# Patient Record
Sex: MALE | Race: WHITE | NOT HISPANIC OR LATINO | Employment: OTHER | URBAN - METROPOLITAN AREA
[De-identification: names, ages, dates, MRNs, and addresses within clinical notes are randomized per-mention and may not be internally consistent; named-entity substitution may affect disease eponyms.]

---

## 2018-12-26 ENCOUNTER — TELEPHONE (OUTPATIENT)
Dept: UROLOGY | Facility: CLINIC | Age: 33
End: 2018-12-26

## 2018-12-26 NOTE — TELEPHONE ENCOUNTER
Returned patient's call, explained that we are not accepting new medicaid pt's at the moment and offered medicaid referral phone line.

## 2018-12-26 NOTE — TELEPHONE ENCOUNTER
----- Message from More Hollingsworth sent at 12/26/2018 11:31 AM CST -----  Contact: self  Patient need to speak to nurse regarding if office received his referral for a vasotomy       Please call to advice [phone] or [phone]

## 2020-10-19 ENCOUNTER — TELEPHONE (OUTPATIENT)
Dept: OTOLARYNGOLOGY | Facility: CLINIC | Age: 35
End: 2020-10-19

## 2020-10-23 ENCOUNTER — TELEPHONE (OUTPATIENT)
Dept: OTOLARYNGOLOGY | Facility: CLINIC | Age: 35
End: 2020-10-23

## 2020-10-29 ENCOUNTER — TELEPHONE (OUTPATIENT)
Dept: OTOLARYNGOLOGY | Facility: CLINIC | Age: 35
End: 2020-10-29

## 2020-10-30 ENCOUNTER — TELEPHONE (OUTPATIENT)
Dept: OTOLARYNGOLOGY | Facility: CLINIC | Age: 35
End: 2020-10-30

## 2020-12-09 ENCOUNTER — HOSPITAL ENCOUNTER (EMERGENCY)
Facility: HOSPITAL | Age: 35
Discharge: LEFT BEFORE DISCHARGE | End: 2020-12-09
Attending: SURGERY
Payer: MEDICAID

## 2020-12-09 VITALS
WEIGHT: 141 LBS | DIASTOLIC BLOOD PRESSURE: 83 MMHG | BODY MASS INDEX: 22.08 KG/M2 | RESPIRATION RATE: 18 BRPM | SYSTOLIC BLOOD PRESSURE: 134 MMHG | HEART RATE: 96 BPM | OXYGEN SATURATION: 100 % | TEMPERATURE: 97 F

## 2020-12-09 DIAGNOSIS — M25.569 ACUTE KNEE PAIN, UNSPECIFIED LATERALITY: Primary | ICD-10-CM

## 2020-12-09 DIAGNOSIS — M25.559 HIP PAIN: ICD-10-CM

## 2020-12-09 PROCEDURE — 99281 EMR DPT VST MAYX REQ PHY/QHP: CPT

## 2020-12-09 NOTE — ED TRIAGE NOTES
"35 y.o. male presents to ER    Chief Complaint   Patient presents with    Neck Pain   Pt reports pain from "head down to my left foot". Pt reports 3 weeks ago he was moving and felt a "pop". Ever since then, pt has been having numbness from "chest up". Pt reports his "knee and hip gave out on me this morning".    No acute distress noted.    "

## 2020-12-09 NOTE — ED PROVIDER NOTES
Encounter Date: 2020       History     Chief Complaint   Patient presents with    Neck Pain     I saw this patient in the triage area after he had been triaged and was considered to be in stable condition with a chronic neck problem.  This is the 3rd facility apparently today that he has been seen at, left AMA from the prior facility and was very disrespectful to the staff that was attempting to evaluate his problem.  When I saw him in the triage area myself I ask him what the problem was, and I perceived his attitude and initial response to me to be very hostile.  I informed him that I would be happy to evaluate him for his acute medical problem and any acute injuries, but that an MRI scan of his cervical spine for a problem of 3 years, which reportedly has already been scheduled as an outpatient procedure as part of his workup, would not be possible today in this emergency department.  The patient began cursing and was very disrespectful, and then left AMA.        Review of patient's allergies indicates:   Allergen Reactions    Pcn [penicillins] Rash    Sulfa (sulfonamide antibiotics) Other (See Comments)     Childhood allergy. Pt is unsure of sx.      History reviewed. No pertinent past medical history.  Past Surgical History:   Procedure Laterality Date    NECK SURGERY      TIBIA FRACTURE SURGERY       Family History   Problem Relation Age of Onset    Heart disease Father     Hypertension Father     Heart disease Paternal Grandmother     Hypertension Paternal Grandmother     Heart disease Paternal Grandfather     Hypertension Paternal Grandfather      Social History     Tobacco Use    Smoking status: Former Smoker     Types: Cigarettes     Quit date: 2011     Years since quittin.6    Smokeless tobacco: Never Used   Substance Use Topics    Alcohol use: No    Drug use: No     Review of Systems   Unable to perform ROS: Acuity of condition       Physical Exam     Initial Vitals [20  1419]   BP Pulse Resp Temp SpO2   134/83 96 18 97.3 °F (36.3 °C) 100 %      MAP       --         Physical Exam    Nursing note and vitals reviewed.  Constitutional: He appears well-developed and well-nourished.   Musculoskeletal: Normal range of motion.   Neurological: He is alert and oriented to person, place, and time.         ED Course   Procedures  Labs Reviewed - No data to display       Imaging Results    None                                      Clinical Impression:     ICD-10-CM ICD-9-CM   1. Acute knee pain, unspecified laterality  M25.569 719.46   2. Hip pain  M25.559 719.45                      Disposition:   Disposition: AMA  Condition: Stable                          Malachi Foster Jr., MD  12/09/20 4718

## 2021-12-31 ENCOUNTER — HOSPITAL ENCOUNTER (EMERGENCY)
Facility: HOSPITAL | Age: 36
Discharge: HOME | End: 2021-12-31
Attending: EMERGENCY MEDICINE
Payer: MEDICAID

## 2021-12-31 VITALS
WEIGHT: 150 LBS | HEART RATE: 72 BPM | BODY MASS INDEX: 23.54 KG/M2 | SYSTOLIC BLOOD PRESSURE: 119 MMHG | TEMPERATURE: 98 F | HEIGHT: 67 IN | RESPIRATION RATE: 18 BRPM | OXYGEN SATURATION: 100 % | DIASTOLIC BLOOD PRESSURE: 61 MMHG

## 2021-12-31 DIAGNOSIS — N50.819 TESTICLE PAIN: ICD-10-CM

## 2021-12-31 DIAGNOSIS — R10.9 ABDOMINAL PAIN, UNSPECIFIED ABDOMINAL LOCATION: Primary | ICD-10-CM

## 2021-12-31 LAB
ALBUMIN SERPL BCP-MCNC: 4.7 G/DL (ref 3.5–5.2)
ALP SERPL-CCNC: 64 U/L (ref 55–135)
ALT SERPL W/O P-5'-P-CCNC: 69 U/L (ref 10–44)
AMPHET+METHAMPHET UR QL: NEGATIVE
ANION GAP SERPL CALC-SCNC: 10 MMOL/L (ref 8–16)
AST SERPL-CCNC: 31 U/L (ref 10–40)
BARBITURATES UR QL SCN>200 NG/ML: NEGATIVE
BASOPHILS # BLD AUTO: 0.06 K/UL (ref 0–0.2)
BASOPHILS NFR BLD: 0.5 % (ref 0–1.9)
BENZODIAZ UR QL SCN>200 NG/ML: NEGATIVE
BILIRUB SERPL-MCNC: 1 MG/DL (ref 0.1–1)
BILIRUB UR QL STRIP: NEGATIVE
BUN SERPL-MCNC: 11 MG/DL (ref 6–20)
BZE UR QL SCN: NEGATIVE
CALCIUM SERPL-MCNC: 9.4 MG/DL (ref 8.7–10.5)
CANNABINOIDS UR QL SCN: ABNORMAL
CHLORIDE SERPL-SCNC: 103 MMOL/L (ref 95–110)
CLARITY UR: CLEAR
CO2 SERPL-SCNC: 27 MMOL/L (ref 23–29)
COLOR UR: YELLOW
CREAT SERPL-MCNC: 0.9 MG/DL (ref 0.5–1.4)
CREAT UR-MCNC: 52 MG/DL (ref 23–375)
DIFFERENTIAL METHOD: ABNORMAL
EOSINOPHIL # BLD AUTO: 0.2 K/UL (ref 0–0.5)
EOSINOPHIL NFR BLD: 1.2 % (ref 0–8)
ERYTHROCYTE [DISTWIDTH] IN BLOOD BY AUTOMATED COUNT: 11.9 % (ref 11.5–14.5)
EST. GFR  (AFRICAN AMERICAN): >60 ML/MIN/1.73 M^2
EST. GFR  (NON AFRICAN AMERICAN): >60 ML/MIN/1.73 M^2
GLUCOSE SERPL-MCNC: 95 MG/DL (ref 70–110)
GLUCOSE UR QL STRIP: NEGATIVE
HCT VFR BLD AUTO: 49.6 % (ref 40–54)
HGB BLD-MCNC: 17.4 G/DL (ref 14–18)
HGB UR QL STRIP: NEGATIVE
IMM GRANULOCYTES # BLD AUTO: 0.15 K/UL (ref 0–0.04)
IMM GRANULOCYTES NFR BLD AUTO: 1.2 % (ref 0–0.5)
KETONES UR QL STRIP: NEGATIVE
LEUKOCYTE ESTERASE UR QL STRIP: NEGATIVE
LIPASE SERPL-CCNC: 28 U/L (ref 4–60)
LYMPHOCYTES # BLD AUTO: 3.8 K/UL (ref 1–4.8)
LYMPHOCYTES NFR BLD: 30.7 % (ref 18–48)
MCH RBC QN AUTO: 31.6 PG (ref 27–31)
MCHC RBC AUTO-ENTMCNC: 35.1 G/DL (ref 32–36)
MCV RBC AUTO: 90 FL (ref 82–98)
MONOCYTES # BLD AUTO: 0.7 K/UL (ref 0.3–1)
MONOCYTES NFR BLD: 5.5 % (ref 4–15)
NEUTROPHILS # BLD AUTO: 7.4 K/UL (ref 1.8–7.7)
NEUTROPHILS NFR BLD: 60.9 % (ref 38–73)
NITRITE UR QL STRIP: NEGATIVE
NRBC BLD-RTO: 0 /100 WBC
OPIATES UR QL SCN: NEGATIVE
PCP UR QL SCN>25 NG/ML: NEGATIVE
PH UR STRIP: 7 [PH] (ref 5–8)
PLATELET # BLD AUTO: 302 K/UL (ref 150–450)
PMV BLD AUTO: 10.4 FL (ref 9.2–12.9)
POTASSIUM SERPL-SCNC: 3.9 MMOL/L (ref 3.5–5.1)
PROT SERPL-MCNC: 8.2 G/DL (ref 6–8.4)
PROT UR QL STRIP: NEGATIVE
RBC # BLD AUTO: 5.51 M/UL (ref 4.6–6.2)
SODIUM SERPL-SCNC: 140 MMOL/L (ref 136–145)
SP GR UR STRIP: 1.01 (ref 1–1.03)
TOXICOLOGY INFORMATION: ABNORMAL
URN SPEC COLLECT METH UR: NORMAL
UROBILINOGEN UR STRIP-ACNC: NEGATIVE EU/DL
WBC # BLD AUTO: 12.21 K/UL (ref 3.9–12.7)

## 2021-12-31 PROCEDURE — 99285 EMERGENCY DEPT VISIT HI MDM: CPT | Mod: 25

## 2021-12-31 PROCEDURE — 81003 URINALYSIS AUTO W/O SCOPE: CPT | Mod: 59 | Performed by: NURSE PRACTITIONER

## 2021-12-31 PROCEDURE — 85025 COMPLETE CBC W/AUTO DIFF WBC: CPT | Performed by: NURSE PRACTITIONER

## 2021-12-31 PROCEDURE — 83690 ASSAY OF LIPASE: CPT | Performed by: NURSE PRACTITIONER

## 2021-12-31 PROCEDURE — 96374 THER/PROPH/DIAG INJ IV PUSH: CPT

## 2021-12-31 PROCEDURE — 80053 COMPREHEN METABOLIC PANEL: CPT | Performed by: NURSE PRACTITIONER

## 2021-12-31 PROCEDURE — 80307 DRUG TEST PRSMV CHEM ANLYZR: CPT | Performed by: NURSE PRACTITIONER

## 2021-12-31 PROCEDURE — 25500020 PHARM REV CODE 255: Performed by: NURSE PRACTITIONER

## 2021-12-31 PROCEDURE — 63600175 PHARM REV CODE 636 W HCPCS: Performed by: EMERGENCY MEDICINE

## 2021-12-31 RX ORDER — TRAZODONE HYDROCHLORIDE 50 MG/1
50 TABLET ORAL
COMMUNITY

## 2021-12-31 RX ORDER — DOXYCYCLINE 100 MG/1
CAPSULE ORAL
COMMUNITY

## 2021-12-31 RX ORDER — GABAPENTIN 300 MG/1
CAPSULE ORAL
COMMUNITY

## 2021-12-31 RX ORDER — ONDANSETRON 2 MG/ML
4 INJECTION INTRAMUSCULAR; INTRAVENOUS
Status: DISCONTINUED | OUTPATIENT
Start: 2021-12-31 | End: 2021-12-31 | Stop reason: HOSPADM

## 2021-12-31 RX ORDER — NAPROXEN 500 MG/1
500 TABLET ORAL 2 TIMES DAILY WITH MEALS
Qty: 30 TABLET | Refills: 0 | Status: SHIPPED | OUTPATIENT
Start: 2021-12-31

## 2021-12-31 RX ORDER — PROMETHAZINE HYDROCHLORIDE AND DEXTROMETHORPHAN HYDROBROMIDE 6.25; 15 MG/5ML; MG/5ML
SYRUP ORAL
COMMUNITY

## 2021-12-31 RX ORDER — KETOROLAC TROMETHAMINE 30 MG/ML
15 INJECTION, SOLUTION INTRAMUSCULAR; INTRAVENOUS
Status: COMPLETED | OUTPATIENT
Start: 2021-12-31 | End: 2021-12-31

## 2021-12-31 RX ORDER — MORPHINE SULFATE 4 MG/ML
4 INJECTION, SOLUTION INTRAMUSCULAR; INTRAVENOUS
Status: DISCONTINUED | OUTPATIENT
Start: 2021-12-31 | End: 2021-12-31 | Stop reason: HOSPADM

## 2021-12-31 RX ORDER — OMEPRAZOLE 20 MG/1
TABLET, DELAYED RELEASE ORAL
COMMUNITY

## 2021-12-31 RX ORDER — OMEPRAZOLE 20 MG/1
CAPSULE, DELAYED RELEASE ORAL
COMMUNITY

## 2021-12-31 RX ORDER — BUTALBITAL, ASPIRIN, AND CAFFEINE 325; 50; 40 MG/1; MG/1; MG/1
CAPSULE ORAL
COMMUNITY

## 2021-12-31 RX ORDER — AZITHROMYCIN 250 MG/1
TABLET, FILM COATED ORAL
COMMUNITY

## 2021-12-31 RX ADMIN — IOHEXOL 100 ML: 350 INJECTION, SOLUTION INTRAVENOUS at 01:12

## 2021-12-31 RX ADMIN — KETOROLAC TROMETHAMINE 15 MG: 30 INJECTION, SOLUTION INTRAMUSCULAR at 02:12

## 2021-12-31 NOTE — FIRST PROVIDER EVALUATION
"Medical screening exam completed.  I have conducted a focused provider triage encounter, findings are as follows:    Brief history of present illness:  36 year old male presents to the ER with complaints of worsening diffuse lower abdominal pain that radiates into rectum and into testicles with pain with having a BM and also pain with urination x 4 days, gradually worsening in severity. No hematuria. No testicle swelling. No abscess or scrotal erythema or rash. No fever. No nausea or fever.     Vitals:    12/31/21 1141   BP: (!) 163/97   BP Location: Left arm   Patient Position: Sitting   Pulse: 104   Resp: 18   Temp: 98 °F (36.7 °C)   TempSrc: Oral   SpO2: 100%   Weight: 68 kg (150 lb)   Height: 5' 7" (1.702 m)       Pertinent physical exam:  Diffuse lower abdominal pain with palpation. Abdomen non rigid. Anxious     Brief workup plan:  See orders placed.     Preliminary workup initiated; this workup will be continued and followed by the physician or advanced practice provider that is assigned to the patient when roomed.  "
Pt. received Supine in bed, +NAD, Heplock, SCDs, Wound CDI.

## 2021-12-31 NOTE — ED PROVIDER NOTES
Encounter Date: 12/31/2021       History     Chief Complaint   Patient presents with    Abdominal Pain     Abd pain that radiates into groin area.   Denies n/v/ fever.    + pain w/ urination and bm's.     Patient presents complaining of pain in the right groin pain between the testicles and rectum that is been intermittent for the last 24 hours.  He denies any fever.  No vomiting.  Mild nausea.  No dysuria.        Review of patient's allergies indicates:   Allergen Reactions    Pcn [penicillins] Rash    Sulfa (sulfonamide antibiotics) Other (See Comments)     Childhood allergy. Pt is unsure of sx.      No past medical history on file.  Past Surgical History:   Procedure Laterality Date    NECK SURGERY      TIBIA FRACTURE SURGERY       Family History   Problem Relation Age of Onset    Heart disease Father     Hypertension Father     Heart disease Paternal Grandmother     Hypertension Paternal Grandmother     Heart disease Paternal Grandfather     Hypertension Paternal Grandfather      Social History     Tobacco Use    Smoking status: Former Smoker     Types: Cigarettes     Quit date: 4/12/2011     Years since quitting: 10.7    Smokeless tobacco: Never Used   Substance Use Topics    Alcohol use: No    Drug use: No     Review of Systems   All other systems reviewed and are negative.      Physical Exam     Initial Vitals [12/31/21 1141]   BP Pulse Resp Temp SpO2   (!) 163/97 104 18 98 °F (36.7 °C) 100 %      MAP       --         Physical Exam    Nursing note and vitals reviewed.  Constitutional: He appears well-developed and well-nourished. He is not diaphoretic. No distress.   Pleasant, polite.   HENT:   Head: Normocephalic and atraumatic.   Eyes: EOM are normal.   Neck: Neck supple.   Normal range of motion.  Cardiovascular: Normal rate, regular rhythm, normal heart sounds and intact distal pulses.   Pulmonary/Chest: Breath sounds normal. No respiratory distress.   Abdominal: Abdomen is soft.    Genitourinary:    Genitourinary Comments: Patient has mild tenderness to the perineum without any definitive abscess.  There is no rectal pain or tenderness on digital exam.  The testicles are normal.  There is no erythema warmth or edema to the testicles.  There is no hernia.  There is a normal lie to both testicles.     Musculoskeletal:         General: Normal range of motion.      Cervical back: Normal range of motion and neck supple.     Neurological: He is alert and oriented to person, place, and time. He has normal strength.   Skin: Skin is warm and dry.   Psychiatric: He has a normal mood and affect. His behavior is normal. Judgment and thought content normal.         ED Course   Procedures  Labs Reviewed   CBC W/ AUTO DIFFERENTIAL - Abnormal; Notable for the following components:       Result Value    MCH 31.6 (*)     Immature Granulocytes 1.2 (*)     Immature Grans (Abs) 0.15 (*)     All other components within normal limits   COMPREHENSIVE METABOLIC PANEL - Abnormal; Notable for the following components:    ALT 69 (*)     All other components within normal limits   DRUG SCREEN PANEL, URINE EMERGENCY - Abnormal; Notable for the following components:    THC Presumptive Positive (*)     All other components within normal limits    Narrative:     Specimen Source->Urine   C. TRACHOMATIS/N. GONORRHOEAE BY AMP DNA   LIPASE   URINALYSIS, REFLEX TO URINE CULTURE    Narrative:     Specimen Source->Urine          Imaging Results          US Scrotum And Testicles (Final result)  Result time 12/31/21 15:40:35    Final result by Elza Basilio MD (12/31/21 15:40:35)                 Narrative:    Testicular ultrasound    Clinical history is pain    The testicles are normal in size and echogenicity. There is normal flow to both testicles.    The right testicle measures 4.6 x 2.8 x 3.8 cm. The left testicle measures 4.8 x 2.9 x 3.4 cm.    There are small bilateral hydroceles. The epididymis are normal in  appearance.    IMPRESSION: Normal sonographic appearance of the testicles    Small bilateral hydroceles    Electronically signed by:  Elza Basilio MD  12/31/2021 3:40 PM CST Workstation: [id number]                             CT Abdomen Pelvis With Contrast (Final result)  Result time 12/31/21 13:53:40    Final result by Diana Vo IV, MD (12/31/21 13:53:40)                 Narrative:    All CT scans at this facility use dose modulation, degenerative reconstruction  and/or weight-based dosing when appropriate to reduce radiation dose to as low as reasonably achievable.    CT of the abdomen with contrast and CT of the pelvis with contrast    HISTORY: Abdominal pain.    The study utilizes 100 cc of intravenous nonionic contrast material.        The liver and spleen are normal in size and enhances homogeneously. The gallbladder, biliary system, pancreas and adrenal glands are unremarkable. The kidneys are normal in size and position without mass, abnormal calcification or hydronephrosis. The aorta tapers normally.    There is no bowel wall thickening or evidence of obstruction. There are no focal inflammatory changes within the abdomen. There is no significant free fluid.    The urinary bladder is unopacified but grossly unremarkable. There are no pelvic masses or adenopathy.    The visualized lung bases are appropriately aerated. A 2 to 3 mm noncalcified nodule in the lingula is likely of little clinical significance. No acute osseous abnormality is observed.    IMPRESSION:    No acute intra-abdominal abnormality.    Electronically signed by:  Diana Vo MD  12/31/2021 1:53 PM CST Workstation: [id number]                               Medications   morphine injection 4 mg (4 mg Intravenous Not Given 12/31/21 1345)   ondansetron injection 4 mg (4 mg Intravenous Not Given 12/31/21 1345)   ketorolac injection 15 mg (15 mg Intravenous Given 12/31/21 1416)     Medical Decision Making:   Initial Assessment:   No  apparent distress  Differential Diagnosis:   Considerations include testicular torsion, hydrocele, hernia, abscess, appendicitis, kidney stone  Clinical Tests:   Lab Tests: Reviewed and Ordered  Radiological Study: Ordered and Reviewed  Medical Tests: Reviewed and Ordered  ED Management:  In the emergency department patient was found to have normal labs.  CT scan shows no acute abnormality.  Testicular ultrasound shows some evidence of small bilateral hydroceles.  Patient received Toradol in the emergency department with relief pain.  At this time it seems in likely etiology of pain is secondary to hydroceles I advised patient follow-up with urology.  Patient be discharged stable condition.  Detailed return precautions discussed.                      Clinical Impression:   Final diagnoses:  [N50.819] Testicle pain  [R10.9] Abdominal pain, unspecified abdominal location (Primary)          ED Disposition Condition    Discharge Stable        ED Prescriptions     Medication Sig Dispense Start Date End Date Auth. Provider    naproxen (NAPROSYN) 500 MG tablet Take 1 tablet (500 mg total) by mouth 2 (two) times daily with meals. 30 tablet 12/31/2021  Wilder Jorge MD        Follow-up Information     Follow up With Specialties Details Why Contact [facility]  In 1 week  [address]  [place] [address]  [phone]      Jazmine De Jesus MD Urology In 1 week  [address]  [address]  [place] [address]  [phone]             Wilder Jorge MD  12/31/21 5203       Wilder Jorge MD  12/31/21 8157

## 2022-01-01 ENCOUNTER — HOSPITAL ENCOUNTER (EMERGENCY)
Facility: HOSPITAL | Age: 37
Discharge: PSYCHIATRIC OR SUBSTANCE USE FACILITY | End: 2022-01-02
Attending: EMERGENCY MEDICINE
Payer: MEDICAID

## 2022-01-01 DIAGNOSIS — R45.851 SUICIDAL IDEATION: ICD-10-CM

## 2022-01-01 DIAGNOSIS — Z00.8 MEDICAL CLEARANCE FOR PSYCHIATRIC ADMISSION: Primary | ICD-10-CM

## 2022-01-01 PROBLEM — F12.20 CANNABIS USE DISORDER, SEVERE, DEPENDENCE: Chronic | Status: ACTIVE | Noted: 2022-01-01

## 2022-01-01 PROBLEM — F31.9 BIPOLAR 1 DISORDER: Chronic | Status: ACTIVE | Noted: 2022-01-01

## 2022-01-01 LAB
ALBUMIN SERPL BCP-MCNC: 4.6 G/DL (ref 3.5–5.2)
ALP SERPL-CCNC: 63 U/L (ref 55–135)
ALT SERPL W/O P-5'-P-CCNC: 63 U/L (ref 10–44)
AMPHET+METHAMPHET UR QL: NEGATIVE
ANION GAP SERPL CALC-SCNC: 12 MMOL/L (ref 8–16)
APAP SERPL-MCNC: <10 UG/ML (ref 10–20)
AST SERPL-CCNC: 34 U/L (ref 10–40)
BARBITURATES UR QL SCN>200 NG/ML: NEGATIVE
BASOPHILS # BLD AUTO: 0.07 K/UL (ref 0–0.2)
BASOPHILS NFR BLD: 0.4 % (ref 0–1.9)
BENZODIAZ UR QL SCN>200 NG/ML: NEGATIVE
BILIRUB SERPL-MCNC: 1.3 MG/DL (ref 0.1–1)
BILIRUB UR QL STRIP: NEGATIVE
BUN SERPL-MCNC: 11 MG/DL (ref 6–20)
BZE UR QL SCN: NEGATIVE
CALCIUM SERPL-MCNC: 9.8 MG/DL (ref 8.7–10.5)
CANNABINOIDS UR QL SCN: ABNORMAL
CHLORIDE SERPL-SCNC: 105 MMOL/L (ref 95–110)
CLARITY UR: CLEAR
CO2 SERPL-SCNC: 21 MMOL/L (ref 23–29)
COLOR UR: YELLOW
CREAT SERPL-MCNC: 0.9 MG/DL (ref 0.5–1.4)
CREAT UR-MCNC: 73 MG/DL (ref 23–375)
DIFFERENTIAL METHOD: ABNORMAL
EOSINOPHIL # BLD AUTO: 0.1 K/UL (ref 0–0.5)
EOSINOPHIL NFR BLD: 0.3 % (ref 0–8)
ERYTHROCYTE [DISTWIDTH] IN BLOOD BY AUTOMATED COUNT: 11.9 % (ref 11.5–14.5)
EST. GFR  (AFRICAN AMERICAN): >60 ML/MIN/1.73 M^2
EST. GFR  (NON AFRICAN AMERICAN): >60 ML/MIN/1.73 M^2
ETHANOL SERPL-MCNC: <5 MG/DL
GLUCOSE SERPL-MCNC: 82 MG/DL (ref 70–110)
GLUCOSE UR QL STRIP: NEGATIVE
HCT VFR BLD AUTO: 49.1 % (ref 40–54)
HGB BLD-MCNC: 17 G/DL (ref 14–18)
HGB UR QL STRIP: NEGATIVE
IMM GRANULOCYTES # BLD AUTO: 0.12 K/UL (ref 0–0.04)
IMM GRANULOCYTES NFR BLD AUTO: 0.7 % (ref 0–0.5)
KETONES UR QL STRIP: NEGATIVE
LEUKOCYTE ESTERASE UR QL STRIP: NEGATIVE
LYMPHOCYTES # BLD AUTO: 2.2 K/UL (ref 1–4.8)
LYMPHOCYTES NFR BLD: 13.1 % (ref 18–48)
MCH RBC QN AUTO: 31.3 PG (ref 27–31)
MCHC RBC AUTO-ENTMCNC: 34.6 G/DL (ref 32–36)
MCV RBC AUTO: 90 FL (ref 82–98)
MONOCYTES # BLD AUTO: 1 K/UL (ref 0.3–1)
MONOCYTES NFR BLD: 5.6 % (ref 4–15)
NEUTROPHILS # BLD AUTO: 13.4 K/UL (ref 1.8–7.7)
NEUTROPHILS NFR BLD: 79.9 % (ref 38–73)
NITRITE UR QL STRIP: NEGATIVE
NRBC BLD-RTO: 0 /100 WBC
OPIATES UR QL SCN: NEGATIVE
PCP UR QL SCN>25 NG/ML: NEGATIVE
PH UR STRIP: 6 [PH] (ref 5–8)
PLATELET # BLD AUTO: 286 K/UL (ref 150–450)
PMV BLD AUTO: 10.3 FL (ref 9.2–12.9)
POTASSIUM SERPL-SCNC: 3.6 MMOL/L (ref 3.5–5.1)
PROT SERPL-MCNC: 8 G/DL (ref 6–8.4)
PROT UR QL STRIP: ABNORMAL
RBC # BLD AUTO: 5.43 M/UL (ref 4.6–6.2)
SALICYLATES SERPL-MCNC: <4 MG/DL (ref 15–30)
SARS-COV-2 RDRP RESP QL NAA+PROBE: NEGATIVE
SODIUM SERPL-SCNC: 138 MMOL/L (ref 136–145)
SP GR UR STRIP: 1.01 (ref 1–1.03)
TOXICOLOGY INFORMATION: ABNORMAL
TSH SERPL DL<=0.005 MIU/L-ACNC: 2.68 UIU/ML (ref 0.34–5.6)
URN SPEC COLLECT METH UR: ABNORMAL
UROBILINOGEN UR STRIP-ACNC: NEGATIVE EU/DL
WBC # BLD AUTO: 16.84 K/UL (ref 3.9–12.7)

## 2022-01-01 PROCEDURE — 99205 PR OFFICE/OUTPT VISIT, NEW, LEVL V, 60-74 MIN: ICD-10-PCS | Mod: AF,HB,95, | Performed by: PSYCHIATRY & NEUROLOGY

## 2022-01-01 PROCEDURE — 80143 DRUG ASSAY ACETAMINOPHEN: CPT | Performed by: EMERGENCY MEDICINE

## 2022-01-01 PROCEDURE — 80179 DRUG ASSAY SALICYLATE: CPT | Performed by: EMERGENCY MEDICINE

## 2022-01-01 PROCEDURE — 80053 COMPREHEN METABOLIC PANEL: CPT | Performed by: EMERGENCY MEDICINE

## 2022-01-01 PROCEDURE — 99285 EMERGENCY DEPT VISIT HI MDM: CPT

## 2022-01-01 PROCEDURE — 85025 COMPLETE CBC W/AUTO DIFF WBC: CPT | Performed by: EMERGENCY MEDICINE

## 2022-01-01 PROCEDURE — 99205 OFFICE O/P NEW HI 60 MIN: CPT | Mod: AF,HB,95, | Performed by: PSYCHIATRY & NEUROLOGY

## 2022-01-01 PROCEDURE — U0002 COVID-19 LAB TEST NON-CDC: HCPCS | Performed by: EMERGENCY MEDICINE

## 2022-01-01 PROCEDURE — 84443 ASSAY THYROID STIM HORMONE: CPT | Performed by: EMERGENCY MEDICINE

## 2022-01-01 PROCEDURE — 81003 URINALYSIS AUTO W/O SCOPE: CPT | Mod: 59 | Performed by: EMERGENCY MEDICINE

## 2022-01-01 PROCEDURE — 82077 ASSAY SPEC XCP UR&BREATH IA: CPT | Performed by: EMERGENCY MEDICINE

## 2022-01-01 PROCEDURE — 80307 DRUG TEST PRSMV CHEM ANLYZR: CPT | Performed by: EMERGENCY MEDICINE

## 2022-01-01 NOTE — ED NOTES
36 YOM [facility] for psychiatric evaluation. -SI/HI or AVH. Requesting refill on medication. Stated has been out for 1 month. Denies any other complaints.     Adult Physical Assessment  LOC: Patient is awake, alert, oriented and speaking appropriately at this time.  APPEARANCE: Patient resting comfortably and appears to be in no acute distress at this time. Patient is clean and well groomed, patient's clothing is properly fastened.  SKIN:The skin is warm and dry, color consistent with ethnicity, patient has normal skin turgor and moist mucus membranes, skin intact, no breakdown or brusing noted.  MUSCULOSKELETAL: Patient moving all extremities well, no obvious swelling or deformities noted.  RESPIRATORY: Airway is open and patent, respirations are spontaneous, patient has a normal effort and rate, no accessory muscle use noted.  CARDIAC: Patient has a normal rate and rhythm, no periphreal edema noted in any extremity, capillary refill < 3 seconds in all extremities.  ABDOMEN: Soft and non tender to palpation, no abdominal distention noted.  NEUROLOGIC: Eyes open spontaneously, behavior appropriate to situation, follows commands, facial expression symmetrical, bilateral hand grasp equal and even, purposeful motor response noted, normal sensation in all extremities when touched with a finger.      VSS. ED workup in progress. Call light within pt reach. Safety measures in place: side rails up X2. Will continue to monitor.
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
## 2022-01-02 VITALS
TEMPERATURE: 98 F | WEIGHT: 150 LBS | SYSTOLIC BLOOD PRESSURE: 125 MMHG | BODY MASS INDEX: 23.54 KG/M2 | RESPIRATION RATE: 18 BRPM | HEART RATE: 66 BPM | HEIGHT: 67 IN | DIASTOLIC BLOOD PRESSURE: 69 MMHG | OXYGEN SATURATION: 98 %
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
## 2022-01-02 NOTE — ED NOTES
Pt updated / pt screaming at staff / pt refusing to leave ED for placement / pt [facility] continues to scream at staff / spd cancelled attempting to contact [facility] for transport
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
## 2022-02-14 ENCOUNTER — TELEPHONE (OUTPATIENT)
Dept: PHYSICAL MEDICINE AND REHAB | Facility: CLINIC | Age: 37
End: 2022-02-14
Payer: MEDICAID

## 2022-02-14 NOTE — TELEPHONE ENCOUNTER
----- Message from Heidi Servin LPN sent at 2/14/2022  4:49 PM CST -----  Regarding: FW: Physical medicine rehab-referral    ----- Message -----  From: Kaye Gonzalez  Sent: 2/14/2022   4:49 PM CST  To: , #  Subject: Physical medicine rehab-referral                 I have scanned the referral and records in to media mgr. Please contact pt to schedule and let me know if I can help any further.    Thank you,  Kaye Estes